# Patient Record
Sex: MALE | Race: WHITE | NOT HISPANIC OR LATINO | URBAN - METROPOLITAN AREA
[De-identification: names, ages, dates, MRNs, and addresses within clinical notes are randomized per-mention and may not be internally consistent; named-entity substitution may affect disease eponyms.]

---

## 2022-04-07 ENCOUNTER — OFFICE VISIT (OUTPATIENT)
Dept: URGENT CARE | Facility: CLINIC | Age: 57
End: 2022-04-07
Payer: COMMERCIAL

## 2022-04-07 VITALS
DIASTOLIC BLOOD PRESSURE: 80 MMHG | HEART RATE: 84 BPM | WEIGHT: 176.2 LBS | OXYGEN SATURATION: 99 % | BODY MASS INDEX: 26.7 KG/M2 | SYSTOLIC BLOOD PRESSURE: 126 MMHG | HEIGHT: 68 IN | RESPIRATION RATE: 16 BRPM | TEMPERATURE: 98.6 F

## 2022-04-07 DIAGNOSIS — K57.92 DIVERTICULITIS: ICD-10-CM

## 2022-04-07 PROBLEM — M10.9 GOUT: Status: ACTIVE | Noted: 2022-04-07

## 2022-04-07 PROBLEM — S83.232A COMPLEX TEAR OF MEDIAL MENISCUS OF LEFT KNEE AS CURRENT INJURY: Status: ACTIVE | Noted: 2019-07-16

## 2022-04-07 PROBLEM — E11.9 DM TYPE 2 WITHOUT RETINOPATHY (HCC): Status: ACTIVE | Noted: 2019-07-15

## 2022-04-07 PROBLEM — R73.03 PREDIABETES: Status: ACTIVE | Noted: 2017-04-03

## 2022-04-07 PROBLEM — M94.262 CHONDROMALACIA OF LEFT KNEE: Status: ACTIVE | Noted: 2019-07-16

## 2022-04-07 PROBLEM — S83.241A TEAR OF MEDIAL MENISCUS OF RIGHT KNEE, CURRENT: Status: ACTIVE | Noted: 2019-07-12

## 2022-04-07 PROBLEM — M17.12 OSTEOARTHRITIS OF LEFT KNEE: Status: ACTIVE | Noted: 2019-07-29

## 2022-04-07 PROCEDURE — 99203 OFFICE O/P NEW LOW 30 MIN: CPT | Performed by: NURSE PRACTITIONER

## 2022-04-07 RX ORDER — AMOXICILLIN AND CLAVULANATE POTASSIUM 875; 125 MG/1; MG/1
1 TABLET, FILM COATED ORAL 2 TIMES DAILY
Qty: 20 TABLET | Refills: 0 | Status: SHIPPED | OUTPATIENT
Start: 2022-04-07 | End: 2022-04-17

## 2022-04-07 RX ORDER — LISINOPRIL 10 MG/1
10 TABLET ORAL DAILY
COMMUNITY

## 2022-04-07 RX ORDER — LISINOPRIL AND HYDROCHLOROTHIAZIDE 12.5; 1 MG/1; MG/1
1 TABLET ORAL
COMMUNITY
Start: 2021-10-29

## 2022-04-07 ASSESSMENT — ENCOUNTER SYMPTOMS
FEVER: 1
NAUSEA: 0
CHILLS: 0
VOMITING: 0
ABDOMINAL PAIN: 1
DIARRHEA: 1

## 2022-04-07 NOTE — PROGRESS NOTES
"Subjective:     Nick Stoner is a 56 y.o. male who presents for Fever (Pain left lower quadrant/ 3days)      Fever   Associated symptoms include abdominal pain and diarrhea. Pertinent negatives include no nausea or vomiting.     Pt presents for evaluation of a new problem. Nick is a very pleasant 56-year-old male who presents to urgent care today with complaints of left lower quadrant abdominal pain consistent with his previous diverticulitis attacks.  He notes that he usually gets approximately 2 flares every year.  This is well managed with antibiotics.  His flare started approximately 3 days ago with diarrhea.  This is now subsided but he continues to have left lower quadrant abdominal pain and low-grade fevers.  Negative for nausea or vomiting.  His pain is rated as a 6/10.  He has been using 200 mg of Motrin every 6 hours for relief of pain and fever.  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    Review of Systems   Constitutional: Positive for fever. Negative for chills and malaise/fatigue.   Gastrointestinal: Positive for abdominal pain and diarrhea. Negative for nausea and vomiting.       PMH: No past medical history on file.  ALLERGIES: No Known Allergies  SURGHX: No past surgical history on file.  SOCHX:   Social History     Socioeconomic History   • Marital status: Single   Tobacco Use   • Smoking status: Never Smoker   • Smokeless tobacco: Never Used   Vaping Use   • Vaping Use: Never used   Substance and Sexual Activity   • Alcohol use: Never   • Drug use: Never     FH: No family history on file.      Objective:   /80 (BP Location: Left arm, Patient Position: Sitting)   Pulse 84   Temp 37 °C (98.6 °F) (Temporal)   Resp 16   Ht 1.727 m (5' 8\")   Wt 79.9 kg (176 lb 3.2 oz)   SpO2 99%   BMI 26.79 kg/m²     Physical Exam  Vitals and nursing note reviewed.   Constitutional:       General: He is not in acute distress.     Appearance: Normal appearance. He is not ill-appearing.   HENT:    "   Head: Normocephalic and atraumatic.      Right Ear: External ear normal.      Left Ear: External ear normal.      Nose: No congestion or rhinorrhea.      Mouth/Throat:      Mouth: Mucous membranes are moist.   Eyes:      Extraocular Movements: Extraocular movements intact.      Pupils: Pupils are equal, round, and reactive to light.   Cardiovascular:      Rate and Rhythm: Normal rate and regular rhythm.      Pulses: Normal pulses.      Heart sounds: Normal heart sounds.   Pulmonary:      Effort: Pulmonary effort is normal.      Breath sounds: Normal breath sounds.   Abdominal:      General: Abdomen is flat. Bowel sounds are normal.      Palpations: Abdomen is soft.      Tenderness: There is abdominal tenderness. There is no right CVA tenderness or left CVA tenderness.       Musculoskeletal:         General: Normal range of motion.      Cervical back: Normal range of motion and neck supple.   Skin:     General: Skin is warm and dry.      Capillary Refill: Capillary refill takes less than 2 seconds.   Neurological:      General: No focal deficit present.      Mental Status: He is alert and oriented to person, place, and time. Mental status is at baseline.   Psychiatric:         Mood and Affect: Mood normal.         Behavior: Behavior normal.         Thought Content: Thought content normal.         Judgment: Judgment normal.         Assessment/Plan:   Assessment    1. Diverticulitis  amoxicillin-clavulanate (AUGMENTIN) 875-125 MG Tab     He will be empirically treated with Augmentin for treatment of the diverticulitis flare.  Patient encouraged to follow back up in clinic or in emergency room for worsening or persistent symptoms.  He is in agreement with his plan of care today.  AVS handout given and reviewed with patient. Pt educated on red flags and when to seek treatment back in ER or UC.

## 2023-02-02 ENCOUNTER — OFFICE VISIT (OUTPATIENT)
Dept: URGENT CARE | Facility: CLINIC | Age: 58
End: 2023-02-02
Payer: COMMERCIAL

## 2023-02-02 ENCOUNTER — HOSPITAL ENCOUNTER (OUTPATIENT)
Facility: MEDICAL CENTER | Age: 58
End: 2023-02-02
Attending: FAMILY MEDICINE
Payer: COMMERCIAL

## 2023-02-02 VITALS
OXYGEN SATURATION: 98 % | BODY MASS INDEX: 27.28 KG/M2 | RESPIRATION RATE: 18 BRPM | HEIGHT: 68 IN | WEIGHT: 180 LBS | HEART RATE: 103 BPM | DIASTOLIC BLOOD PRESSURE: 100 MMHG | SYSTOLIC BLOOD PRESSURE: 152 MMHG | TEMPERATURE: 99.5 F

## 2023-02-02 DIAGNOSIS — N49.2 SCROTAL ABSCESS: ICD-10-CM

## 2023-02-02 DIAGNOSIS — R03.0 ELEVATED BLOOD PRESSURE READING: ICD-10-CM

## 2023-02-02 DIAGNOSIS — N49.2 CELLULITIS, SCROTUM: ICD-10-CM

## 2023-02-02 PROCEDURE — 87070 CULTURE OTHR SPECIMN AEROBIC: CPT

## 2023-02-02 PROCEDURE — 99214 OFFICE O/P EST MOD 30 MIN: CPT | Performed by: FAMILY MEDICINE

## 2023-02-02 PROCEDURE — 87205 SMEAR GRAM STAIN: CPT

## 2023-02-02 PROCEDURE — 87077 CULTURE AEROBIC IDENTIFY: CPT | Mod: 91

## 2023-02-02 RX ORDER — SULFAMETHOXAZOLE AND TRIMETHOPRIM 800; 160 MG/1; MG/1
1 TABLET ORAL 2 TIMES DAILY
Qty: 10 TABLET | Refills: 0 | Status: SHIPPED | OUTPATIENT
Start: 2023-02-02 | End: 2023-02-07

## 2023-02-02 RX ORDER — CEPHALEXIN 500 MG/1
500 CAPSULE ORAL 4 TIMES DAILY
Qty: 20 CAPSULE | Refills: 0 | Status: SHIPPED | OUTPATIENT
Start: 2023-02-02 | End: 2023-02-07

## 2023-02-02 ASSESSMENT — ENCOUNTER SYMPTOMS
SHORTNESS OF BREATH: 0
FEVER: 1
NAUSEA: 0
CHILLS: 0
DIZZINESS: 0
VOMITING: 0
COUGH: 0
MYALGIAS: 0
SORE THROAT: 0

## 2023-02-02 ASSESSMENT — FIBROSIS 4 INDEX: FIB4 SCORE: 0.78

## 2023-02-03 LAB
GRAM STN SPEC: NORMAL
SIGNIFICANT IND 70042: NORMAL
SITE SITE: NORMAL
SOURCE SOURCE: NORMAL

## 2023-02-03 NOTE — PROGRESS NOTES
Subjective:   Nick Stoner is a 57 y.o. male who presents for Cyst (X 2 days, possible cyst on testicle  )        Cyst  This is a new (Reports superficial swelling and lump on skin of scrotum for the past 2 weeks, recently increased pain or drainage over the past 2 days) problem. Episode onset: Worse last 2 days. The problem occurs constantly. The problem has been gradually worsening. Associated symptoms include a fever (Reports subjective fever). Pertinent negatives include no chills, coughing, myalgias, nausea, rash, sore throat, urinary symptoms or vomiting. Associated symptoms comments: Reports history of hypertension, reports not taking prescribed lisinopril over the past 3 days. Treatments tried: Routine skin hygiene. The treatment provided no relief.   PMH:  has no past medical history on file.  MEDS:   Current Outpatient Medications:     metFORMIN (GLUCOPHAGE) 500 MG Tab, Take 500 mg by mouth 2 times a day with meals., Disp: , Rfl:     sulfamethoxazole-trimethoprim (BACTRIM DS) 800-160 MG tablet, Take 1 Tablet by mouth 2 times a day for 5 days., Disp: 10 Tablet, Rfl: 0    cephALEXin (KEFLEX) 500 MG Cap, Take 1 Capsule by mouth 4 times a day for 5 days., Disp: 20 Capsule, Rfl: 0    meloxicam (MOBIC) 15 MG tablet, Take 1 Tablet by mouth every day., Disp: 30 Tablet, Rfl: 2    lisinopril (PRINIVIL) 10 MG Tab, Take 10 mg by mouth every day. Indications: High Blood Pressure Disorder, Disp: , Rfl:     lisinopril-hydrochlorothiazide (PRINZIDE) 10-12.5 MG per tablet, Take 1 Tablet by mouth every day., Disp: , Rfl:   ALLERGIES: No Known Allergies  SURGHX: History reviewed. No pertinent surgical history.  SOCHX:  reports that he has never smoked. He has never used smokeless tobacco. He reports that he does not drink alcohol and does not use drugs.  FH: History reviewed. No pertinent family history.  Review of Systems   Constitutional:  Positive for fever (Reports subjective fever). Negative for chills.  "  HENT:  Negative for sore throat.    Respiratory:  Negative for cough and shortness of breath.    Gastrointestinal:  Negative for nausea and vomiting.   Genitourinary:  Negative for dysuria and frequency.   Musculoskeletal:  Negative for myalgias.   Skin:  Negative for rash.   Neurological:  Negative for dizziness.      Objective:   BP (!) 152/100 (BP Location: Left arm, Patient Position: Sitting, BP Cuff Size: Adult long)   Pulse (!) 103   Temp 37.5 °C (99.5 °F) (Temporal)   Resp 18   Ht 1.727 m (5' 8\")   Wt 81.6 kg (180 lb)   SpO2 98%   BMI 27.37 kg/m²   Physical Exam  Vitals and nursing note reviewed.   Constitutional:       General: He is not in acute distress.     Appearance: He is well-developed.   HENT:      Head: Normocephalic and atraumatic.      Right Ear: External ear normal.      Left Ear: External ear normal.      Nose: Nose normal.      Mouth/Throat:      Mouth: Mucous membranes are moist.   Eyes:      Conjunctiva/sclera: Conjunctivae normal.   Cardiovascular:      Rate and Rhythm: Normal rate.   Pulmonary:      Effort: Pulmonary effort is normal. No respiratory distress.      Breath sounds: Normal breath sounds.   Abdominal:      General: There is no distension.   Genitourinary:         Comments: Superficial erythematous located directly in the periphery of the superficial scrotal abscess, no diffuse scrotal or perineal perineum erythema,induration, edema or pain  Musculoskeletal:         General: Normal range of motion.   Skin:     General: Skin is warm and dry.   Neurological:      General: No focal deficit present.      Mental Status: He is alert and oriented to person, place, and time. Mental status is at baseline.      Gait: Gait (gait at baseline) normal.   Psychiatric:         Judgment: Judgment normal.         Assessment/Plan:   1. Scrotal abscess  - CULTURE WOUND W/ GRAM STAIN; Future  - sulfamethoxazole-trimethoprim (BACTRIM DS) 800-160 MG tablet; Take 1 Tablet by mouth 2 times a day " for 5 days.  Dispense: 10 Tablet; Refill: 0  - cephALEXin (KEFLEX) 500 MG Cap; Take 1 Capsule by mouth 4 times a day for 5 days.  Dispense: 20 Capsule; Refill: 0    2. Elevated blood pressure reading    3. Cellulitis, scrotum  - CULTURE WOUND W/ GRAM STAIN; Future  - sulfamethoxazole-trimethoprim (BACTRIM DS) 800-160 MG tablet; Take 1 Tablet by mouth 2 times a day for 5 days.  Dispense: 10 Tablet; Refill: 0  - cephALEXin (KEFLEX) 500 MG Cap; Take 1 Capsule by mouth 4 times a day for 5 days.  Dispense: 20 Capsule; Refill: 0    Other orders  - metFORMIN (GLUCOPHAGE) 500 MG Tab; Take 500 mg by mouth 2 times a day with meals.        Medical Decision Making/Course:  In the course of preparing for this visit with review of the pertinent past medical history, recent and past clinic visits, current medications, and performing chart, immunization, medical history and medication reconciliation, and in the further course of obtaining the current history pertinent to the clinic visit today, performing an exam and evaluation, ordering and independently evaluating labs, tests including wound culture of scrotal abscess for culture and sensitivity, and/or procedures, prescribing any recommended new medications as noted above, providing any pertinent counseling and education and recommending further coordination of care including recommendations to continue taking hypertensive medications as prescribed and otherwise follow-up with primary care provider for recheck and reevaluation of hypertension management and recommendations for symptomatic and supportive measures of scrotal abscess and cellulitis with recommendations to return or go directly to the emergency department for any persistent or worsening symptoms, at least  33 minutes of total time were spent during this encounter.      Discussed close monitoring, return precautions, and supportive measures of maintaining adequate fluid hydration and caloric intake, relative rest and  symptom management as needed for pain and/or fever.    Differential diagnosis, natural history, supportive care, and indications for immediate follow-up discussed.     Advised the patient to follow-up with the primary care physician for recheck, reevaluation, and consideration of further management.    Please note that this dictation was created using voice recognition software. I have worked with consultants from the vendor as well as technical experts from [facility] to optimize the interface. I have made every reasonable attempt to correct obvious errors, but I expect that there are errors of grammar and possibly content that I did not discover before finalizing the note.

## 2023-02-06 LAB
BACTERIA WND AEROBE CULT: ABNORMAL
GRAM STN SPEC: ABNORMAL
SIGNIFICANT IND 70042: ABNORMAL
SITE SITE: ABNORMAL
SOURCE SOURCE: ABNORMAL

## 2024-11-05 ENCOUNTER — OFFICE VISIT (OUTPATIENT)
Dept: URGENT CARE | Facility: CLINIC | Age: 59
End: 2024-11-05
Payer: COMMERCIAL

## 2024-11-05 VITALS
RESPIRATION RATE: 18 BRPM | HEIGHT: 68 IN | SYSTOLIC BLOOD PRESSURE: 160 MMHG | WEIGHT: 172 LBS | TEMPERATURE: 97.9 F | HEART RATE: 104 BPM | BODY MASS INDEX: 26.07 KG/M2 | OXYGEN SATURATION: 98 % | DIASTOLIC BLOOD PRESSURE: 90 MMHG

## 2024-11-05 DIAGNOSIS — I10 HYPERTENSION, UNSPECIFIED TYPE: ICD-10-CM

## 2024-11-05 DIAGNOSIS — R05.1 ACUTE COUGH: ICD-10-CM

## 2024-11-05 DIAGNOSIS — J06.9 VIRAL URI: ICD-10-CM

## 2024-11-05 LAB
FLUAV RNA SPEC QL NAA+PROBE: NEGATIVE
FLUBV RNA SPEC QL NAA+PROBE: NEGATIVE
RSV RNA SPEC QL NAA+PROBE: NEGATIVE
SARS-COV-2 RNA RESP QL NAA+PROBE: NEGATIVE

## 2024-11-05 PROCEDURE — 0241U POCT CEPHEID COV-2, FLU A/B, RSV - PCR: CPT

## 2024-11-05 PROCEDURE — 3077F SYST BP >= 140 MM HG: CPT

## 2024-11-05 PROCEDURE — 3080F DIAST BP >= 90 MM HG: CPT

## 2024-11-05 PROCEDURE — 99214 OFFICE O/P EST MOD 30 MIN: CPT

## 2024-11-05 RX ORDER — BENZONATATE 100 MG/1
100 CAPSULE ORAL 3 TIMES DAILY PRN
Qty: 30 CAPSULE | Refills: 0 | Status: SHIPPED | OUTPATIENT
Start: 2024-11-05 | End: 2024-11-15

## 2024-11-05 ASSESSMENT — ENCOUNTER SYMPTOMS
SHORTNESS OF BREATH: 0
SORE THROAT: 1
COUGH: 1

## 2024-11-05 NOTE — PROGRESS NOTES
CHIEF COMPLAINT  Chief Complaint   Patient presents with    Cough     Cough, sore throat x 4 days     Subjective:   Nick Stoner is a 59 y.o. male who presents to urgent care with concerns for cough, sore throat and subjective fever x 3-4 days.  Patient denies any symptoms of shortness of breath.  He is reports associated symptoms of mild bodyaches.  Denies any nausea vomiting or diarrhea.  Patient reports attempting to alleviate symptoms with Advil cold and sinus.  Denies any other pertinent past medical history.        Review of Systems   HENT:  Positive for congestion and sore throat.    Respiratory:  Positive for cough. Negative for shortness of breath.        PAST MEDICAL HISTORY  Patient Active Problem List    Diagnosis Date Noted    Gout 04/07/2022    Osteoarthritis of left knee 07/29/2019    Chondromalacia of left knee 07/16/2019    Complex tear of medial meniscus of left knee as current injury 07/16/2019    DM type 2 without retinopathy (HCC) 07/15/2019    Tear of medial meniscus of right knee, current 07/12/2019    Prediabetes 04/03/2017    Essential hypertension 02/29/2016    Dyslipidemia 12/11/2015    History of colonic polyps 01/29/2011    Diverticulitis of colon 09/09/2010       SURGICAL HISTORY  patient denies any surgical history    ALLERGIES  No Known Allergies    CURRENT MEDICATIONS  Home Medications       Reviewed by Chuyita Parnell Med Ass't (Medical Assistant) on 11/05/24 at 1428  Med List Status: <None>     Medication Last Dose Status   lisinopril (PRINIVIL) 10 MG Tab  Active   lisinopril-hydrochlorothiazide (PRINZIDE) 10-12.5 MG per tablet  Active   meloxicam (MOBIC) 15 MG tablet Not Taking Active   metFORMIN (GLUCOPHAGE) 500 MG Tab Taking Active                    SOCIAL HISTORY  Social History     Tobacco Use    Smoking status: Never    Smokeless tobacco: Never   Vaping Use    Vaping status: Never Used   Substance and Sexual Activity    Alcohol use: Never    Drug use: Never     "Sexual activity: Not on file       FAMILY HISTORY  No family history on file.      Medications, Allergies, and current problem list reviewed today in Epic.     Objective:     BP (!) 160/90   Pulse (!) 104   Temp 36.6 °C (97.9 °F) (Temporal)   Resp 18   Ht 1.727 m (5' 8\")   Wt 78 kg (172 lb)   SpO2 98%     Physical Exam  Vitals reviewed.   Constitutional:       General: He is not in acute distress.     Appearance: Normal appearance. He is normal weight. He is not ill-appearing or toxic-appearing.   HENT:      Head: Normocephalic.      Right Ear: Tympanic membrane normal.      Left Ear: Tympanic membrane normal.      Nose: Congestion present.      Mouth/Throat:      Mouth: Mucous membranes are moist.      Pharynx: Oropharynx is clear. No oropharyngeal exudate or posterior oropharyngeal erythema.   Cardiovascular:      Rate and Rhythm: Normal rate and regular rhythm.      Pulses: Normal pulses.      Heart sounds: Normal heart sounds.   Pulmonary:      Effort: Pulmonary effort is normal. No respiratory distress.      Breath sounds: Normal breath sounds. No stridor. No wheezing, rhonchi or rales.   Musculoskeletal:      Cervical back: Normal range of motion and neck supple.   Skin:     General: Skin is warm.      Capillary Refill: Capillary refill takes less than 2 seconds.   Neurological:      General: No focal deficit present.      Mental Status: He is alert.   Psychiatric:         Mood and Affect: Mood normal.         Assessment/Plan:     Diagnosis and associated orders:     1. Acute cough  benzonatate (TESSALON) 100 MG Cap    POCT CoV-2, Flu A/B, RSV by PCR      2. Viral URI        3. Hypertension, unspecified type           Comments/MDM:     The patient presents with symptoms suspicious for likely viral upper respiratory infection. Differential includes bacterial pneumonia, sinusitis, allergic rhinitis. Do not suspect underlying cardiopulmonary process. I considered, but think unlikely, dangerous causes of " this patient’s symptoms to include CHF or COPD exacerbations, pneumonia, pneumothorax. Patient is nontoxic appearing and not in need of emergent medical intervention. They have a normal pulse oximetry on room air, afebrile, and a normal pulmonary exam. Overall, the patient is very well appearing. I do not feel that this patient would benefit from antibiotics at this time.   Recommended symptomatic and supportive care at this time that includes plenty of fluids, rest, Tylenol/Ibuprofen for pain/fever, warm salt water gargles for sore throat, OTC cough and decongestant medication, Flonase, nasal saline washes.    Benzoate sent to [facility] pharmacy for treatment of acute cough.  Patient counseled on medication administration.  Discussed elevated blood pressure today in clinic.  Patient does reports that he monitors blood pressure at home, and attributes elevation to work stress.  Advised patient to reevaluate blood pressure once he is at home and symptoms have improved.  Follow-up with PCP if blood pressure remains elevated.  He denies any symptoms of chest pain, shortness of breath, dizziness or headache.  No other red flag signs or symptoms.  POC viral swab completed  Red flag signs symptoms discussed.  Instructed to return to ER urgent care if symptoms worsen or fail to resolve.         Differential diagnosis, natural history, supportive care, and indications for immediate follow-up discussed.    Advised the patient to follow-up with the primary care physician for recheck, reevaluation, and consideration of further management.    Please note that this dictation was created using voice recognition software. I have made a reasonable attempt to correct obvious errors, but I expect that there are errors of grammar and possibly content that I did not discover before finalizing the note.    This note was electronically signed by DAYDAY Cordero

## 2025-04-10 ENCOUNTER — OFFICE VISIT (OUTPATIENT)
Dept: URGENT CARE | Facility: CLINIC | Age: 60
End: 2025-04-10
Payer: COMMERCIAL

## 2025-04-10 VITALS
WEIGHT: 175 LBS | HEART RATE: 87 BPM | DIASTOLIC BLOOD PRESSURE: 80 MMHG | RESPIRATION RATE: 18 BRPM | TEMPERATURE: 97.9 F | SYSTOLIC BLOOD PRESSURE: 126 MMHG | OXYGEN SATURATION: 97 % | BODY MASS INDEX: 26.61 KG/M2

## 2025-04-10 DIAGNOSIS — J01.40 ACUTE NON-RECURRENT PANSINUSITIS: ICD-10-CM

## 2025-04-10 PROCEDURE — 3079F DIAST BP 80-89 MM HG: CPT | Performed by: NURSE PRACTITIONER

## 2025-04-10 PROCEDURE — 3074F SYST BP LT 130 MM HG: CPT | Performed by: NURSE PRACTITIONER

## 2025-04-10 PROCEDURE — 99213 OFFICE O/P EST LOW 20 MIN: CPT | Performed by: NURSE PRACTITIONER

## 2025-04-10 RX ORDER — FLUTICASONE PROPIONATE 50 MCG
1 SPRAY, SUSPENSION (ML) NASAL DAILY
Qty: 16 G | Refills: 0 | Status: SHIPPED | OUTPATIENT
Start: 2025-04-10

## 2025-04-10 ASSESSMENT — ENCOUNTER SYMPTOMS
CHILLS: 0
SWOLLEN GLANDS: 0
FEVER: 0
SINUS PAIN: 1
COUGH: 0
SINUS PRESSURE: 1
HEADACHES: 0
NECK PAIN: 0

## 2025-04-11 NOTE — PROGRESS NOTES
Subjective:   Nick Stoner is a 59 y.o. male who presents for Sinus Problem (Sinus pain, feverish, chills x 2 days)      Sinus Problem  This is a new problem. Episode onset: 3 days. The problem has been gradually worsening since onset. The pain is moderate. Associated symptoms include sinus pressure. Pertinent negatives include no chills, congestion, coughing, ear pain, headaches, neck pain or swollen glands. Past treatments include nothing.       Review of Systems   Constitutional:  Positive for malaise/fatigue. Negative for chills and fever.   HENT:  Positive for sinus pressure and sinus pain. Negative for congestion and ear pain.    Respiratory:  Negative for cough.    Musculoskeletal:  Negative for neck pain.   Neurological:  Negative for headaches.       Medications:    amoxicillin-clavulanate Tabs  fluticasone  lisinopril Tabs  lisinopril-hydrochlorothiazide  meloxicam  metFORMIN Tabs    Allergies: Patient has no known allergies.    Problem List: Nick Stoner does not have any pertinent problems on file.    Surgical History:  No past surgical history on file.    Past Social Hx: Nick Stoner  reports that he has never smoked. He has never used smokeless tobacco. He reports that he does not drink alcohol and does not use drugs.     Past Family Hx:  Nick Stoner family history is not on file.     Problem list, medications, and allergies reviewed by myself today in Epic.     Objective:     /80   Pulse 87   Temp 36.6 °C (97.9 °F) (Temporal)   Resp 18   Wt 79.4 kg (175 lb)   SpO2 97%   BMI 26.61 kg/m²     Physical Exam  Constitutional:       Appearance: Normal appearance. He is not ill-appearing or toxic-appearing.   HENT:      Head: Normocephalic.      Right Ear: External ear normal.      Left Ear: External ear normal.      Nose:      Left Sinus: Maxillary sinus tenderness present.      Mouth/Throat:      Lips: Pink.   Eyes:      General: Lids are normal.    Pulmonary:      Effort: Pulmonary effort is normal. No accessory muscle usage.   Musculoskeletal:      Cervical back: Full passive range of motion without pain.   Neurological:      Mental Status: He is alert and oriented to person, place, and time.   Psychiatric:         Mood and Affect: Mood normal.         Thought Content: Thought content normal.         Assessment/Plan:     Diagnosis and associated orders:     1. Acute non-recurrent pansinusitis  amoxicillin-clavulanate (AUGMENTIN) 875-125 MG Tab    fluticasone (FLONASE) 50 MCG/ACT nasal spray         Comments/MDM:     I personally reviewed prior external notes and prior test results pertinent to today's visit.   Discussed management options, risks and benefits, and alternatives to treatment plan agreed upon.   Red flags discussed and indications to immediately call 911 or present to the Emergency Department.   Supportive care, differential diagnoses, and indications for immediate follow-up discussed with patient.    Patient expresses understanding and agrees to plan. Patient denies any other questions or concerns.                Please note that this dictation was created using voice recognition software. I have made a reasonable attempt to correct obvious errors, but I expect that there are errors of grammar and possibly content that I did not discover before finalizing the note.    This note was electronically signed by Angel MOY.